# Patient Record
Sex: MALE | Race: WHITE | NOT HISPANIC OR LATINO | Employment: FULL TIME | URBAN - NONMETROPOLITAN AREA
[De-identification: names, ages, dates, MRNs, and addresses within clinical notes are randomized per-mention and may not be internally consistent; named-entity substitution may affect disease eponyms.]

---

## 2017-01-30 ENCOUNTER — OFFICE VISIT (OUTPATIENT)
Dept: INTERNAL MEDICINE | Facility: CLINIC | Age: 22
End: 2017-01-30

## 2017-01-30 VITALS
DIASTOLIC BLOOD PRESSURE: 80 MMHG | HEIGHT: 71 IN | SYSTOLIC BLOOD PRESSURE: 120 MMHG | WEIGHT: 223 LBS | HEART RATE: 79 BPM | BODY MASS INDEX: 31.22 KG/M2 | OXYGEN SATURATION: 98 %

## 2017-01-30 DIAGNOSIS — B37.2 CUTANEOUS CANDIDIASIS: Primary | ICD-10-CM

## 2017-01-30 PROCEDURE — 99213 OFFICE O/P EST LOW 20 MIN: CPT | Performed by: FAMILY MEDICINE

## 2017-01-30 RX ORDER — CLOTRIMAZOLE 1 %
CREAM (GRAM) TOPICAL 2 TIMES DAILY
Qty: 60 G | Refills: 2 | Status: SHIPPED | OUTPATIENT
Start: 2017-01-30 | End: 2017-09-25

## 2017-01-30 NOTE — MR AVS SNAPSHOT
Rickey Shaw   1/30/2017 11:45 AM   Office Visit    Provider:  Deric Pimentel DO   Department:  [facility] PRIMARY CARE   Dept Phone:  [phone]                Your Full Care Plan              Today's Medication Changes          These changes are accurate as of: 1/30/17 12:58 PM.  If you have any questions, ask your nurse or doctor.               New Medication(s)Ordered:     clotrimazole 1 % cream   Commonly known as:  LOTRIMIN   Apply  topically 2 (Two) Times a Day.   Started by:  Deric Pimentel DO         Stop taking medication(s)listed here:     albuterol 108 (90 BASE) MCG/ACT inhaler   Commonly known as:  PROVENTIL HFA;VENTOLIN HFA   Stopped by:  Deric Pimentel DO           azithromycin 250 MG tablet   Commonly known as:  ZITHROMAX Z-MCKAYLA   Stopped by:  Deric Pimentel DO           benzonatate 200 MG capsule   Commonly known as:  TESSALON   Stopped by:  Deric Pimentel DO           ibuprofen 200 MG tablet   Commonly known as:  ADVIL,MOTRIN   Stopped by:  Deric Pimentel DO                Where to Get Your Medications      These medications were sent to [facility] [address] [place] [address] - [address] [place] [address] - [phone] [place] [phone] [address] [address]     Phone:  [phone]     clotrimazole 1 % cream                  Your Updated Medication List          This list is accurate as of: 1/30/17 12:58 PM.  Always use your most recent med list.                clotrimazole 1 % cream   Commonly known as:  LOTRIMIN   Apply  topically 2 (Two) Times a Day.               You Were Diagnosed With        Codes Comments    Cutaneous candidiasis    -  Primary ICD-10-CM: B37.2  ICD-9-CM: 112.3       Instructions     None    Patient Instructions History      MyChart Signup     Our records indicate that your [facility] [employer] account has been deactivated. If you would like to reactivate your account, please email [email or web address] or  "call [phone] to talk to our MyChart staff.             Other Info from Your Visit           Allergies     Penicillins      Sulfa Antibiotics        Reason for Visit     Rash NOTICED SATURDAY, RIGHT ARM       Vital Signs     Blood Pressure Pulse Height Weight Oxygen Saturation Body Mass Index    120/80 79 71\" (180.3 cm) 223 lb (101 kg) 98% 31.1 kg/m2    Smoking Status                   Never Smoker           Problems and Diagnoses Noted     Yeast infection of the skin    -  Primary      "

## 2017-01-30 NOTE — PROGRESS NOTES
"Subjective   Rickey Shaw is a 21 y.o. male.     History of Present Illness   Rickey just noticed this rash on his right forearm, proximal to elbow, on Saturday.  He's been putting peroxide on it.  He states that it's foaming and he thinks that means it is getting infected.      The following portions of the patient's history were reviewed and updated as appropriate: allergies, current medications, past social history and problem list.    Review of Systems   Constitutional: Negative for appetite change, chills, fatigue, fever and unexpected weight change.   HENT: Negative for congestion, ear pain, hearing loss, nosebleeds, postnasal drip, rhinorrhea, sore throat, tinnitus and trouble swallowing.    Eyes: Negative for photophobia, discharge and visual disturbance.   Respiratory: Negative for cough, chest tightness, shortness of breath and wheezing.    Cardiovascular: Negative for chest pain, palpitations and leg swelling.   Gastrointestinal: Negative for abdominal distention, abdominal pain, blood in stool, constipation, diarrhea, nausea and vomiting.   Endocrine: Negative for cold intolerance, heat intolerance, polydipsia, polyphagia and polyuria.   Musculoskeletal: Negative for arthralgias, back pain, joint swelling, myalgias, neck pain and neck stiffness.   Skin: Negative for color change, pallor, rash and wound.        Rash, right forearm.   Allergic/Immunologic: Negative for environmental allergies, food allergies and immunocompromised state.   Neurological: Negative for dizziness, tremors, seizures, weakness, numbness and headaches.   Hematological: Negative for adenopathy. Does not bruise/bleed easily.   Psychiatric/Behavioral: Negative for agitation, behavioral problems, confusion, hallucinations, self-injury and suicidal ideas. The patient is not nervous/anxious.        Objective   Visit Vitals   • /80   • Pulse 79   • Ht 71\" (180.3 cm)   • Wt 223 lb (101 kg)   • SpO2 98%   • BMI 31.1 kg/m2 "     Physical Exam   Constitutional: He is oriented to person, place, and time. He appears well-developed and well-nourished. No distress.   HENT:   Head: Normocephalic and atraumatic.   Right Ear: External ear normal.   Left Ear: External ear normal.   Nose: Nose normal.   Mouth/Throat: Oropharynx is clear and moist.   Eyes: Conjunctivae and EOM are normal. Pupils are equal, round, and reactive to light. Right eye exhibits no discharge. Left eye exhibits no discharge. No scleral icterus.   Neck: Normal range of motion. Neck supple. No JVD present. No tracheal deviation present. No thyromegaly present.   Cardiovascular: Normal rate, regular rhythm, normal heart sounds and intact distal pulses.  Exam reveals no gallop and no friction rub.    No murmur heard.  Pulmonary/Chest: Effort normal and breath sounds normal. No stridor. No respiratory distress. He has no wheezes. He has no rales. He exhibits no tenderness.   Abdominal: Soft. Bowel sounds are normal. He exhibits no distension and no mass. There is no tenderness. There is no rebound and no guarding. No hernia.   Musculoskeletal: Normal range of motion. He exhibits no edema, tenderness or deformity.   Lymphadenopathy:     He has no cervical adenopathy.   Neurological: He is alert and oriented to person, place, and time. He has normal reflexes. He displays normal reflexes. No cranial nerve deficit. He exhibits normal muscle tone.   Skin: Skin is warm and dry. No rash noted. No erythema. No pallor.   Psychiatric: He has a normal mood and affect. His behavior is normal. Judgment normal.       Assessment/Plan   Problem List Items Addressed This Visit     None      Visit Diagnoses     Cutaneous candidiasis    -  Primary    Relevant Medications    clotrimazole (LOTRIMIN) 1 % cream        Fu prn.

## 2017-03-03 ENCOUNTER — OFFICE VISIT (OUTPATIENT)
Dept: INTERNAL MEDICINE | Facility: CLINIC | Age: 22
End: 2017-03-03

## 2017-03-03 VITALS
WEIGHT: 220 LBS | OXYGEN SATURATION: 98 % | DIASTOLIC BLOOD PRESSURE: 70 MMHG | BODY MASS INDEX: 30.8 KG/M2 | HEIGHT: 71 IN | HEART RATE: 83 BPM | SYSTOLIC BLOOD PRESSURE: 110 MMHG

## 2017-03-03 DIAGNOSIS — R21 RASH: Primary | ICD-10-CM

## 2017-03-03 PROCEDURE — 99213 OFFICE O/P EST LOW 20 MIN: CPT | Performed by: FAMILY MEDICINE

## 2017-03-03 RX ORDER — FLUCONAZOLE 150 MG/1
150 TABLET ORAL EVERY OTHER DAY
Qty: 5 TABLET | Refills: 0 | Status: SHIPPED | OUTPATIENT
Start: 2017-03-03 | End: 2017-09-25

## 2017-03-03 RX ORDER — CLOTRIMAZOLE AND BETAMETHASONE DIPROPIONATE 10; .64 MG/G; MG/G
CREAM TOPICAL 2 TIMES DAILY
Qty: 45 G | Refills: 2 | Status: SHIPPED | OUTPATIENT
Start: 2017-03-03 | End: 2017-09-25

## 2017-03-03 NOTE — PROGRESS NOTES
"Subjective   Rickey Shaw is a 21 y.o. male.     History of Present Illness   Rickey has been using the the clotrimazole and had no relief in his rash.  He used OTC ringworm and did not get any benefit.  He is fine with seeing Derm.  He is fine with trying lotrisone and diflucan until seeing EDerm.        The following portions of the patient's history were reviewed and updated as appropriate: allergies, current medications, past social history and problem list.    Review of Systems   Constitutional: Negative for appetite change, chills, fatigue, fever and unexpected weight change.   HENT: Negative for congestion, ear pain, hearing loss, nosebleeds, postnasal drip, rhinorrhea, sore throat, tinnitus and trouble swallowing.    Eyes: Negative for photophobia, discharge and visual disturbance.   Respiratory: Negative for cough, chest tightness, shortness of breath and wheezing.    Cardiovascular: Negative for chest pain, palpitations and leg swelling.   Gastrointestinal: Negative for abdominal distention, abdominal pain, blood in stool, constipation, diarrhea, nausea and vomiting.   Endocrine: Negative for cold intolerance, heat intolerance, polydipsia, polyphagia and polyuria.   Musculoskeletal: Negative for arthralgias, back pain, joint swelling, myalgias, neck pain and neck stiffness.   Skin: Negative for color change, pallor, rash and wound.   Allergic/Immunologic: Negative for environmental allergies, food allergies and immunocompromised state.   Neurological: Negative for dizziness, tremors, seizures, weakness, numbness and headaches.   Hematological: Negative for adenopathy. Does not bruise/bleed easily.   Psychiatric/Behavioral: Negative for agitation, behavioral problems, confusion, hallucinations, self-injury and suicidal ideas. The patient is not nervous/anxious.        Objective   Visit Vitals   • /70   • Pulse 83   • Ht 71\" (180.3 cm)   • Wt 220 lb (99.8 kg)   • SpO2 98%   • BMI 30.68 kg/m2 "     Physical Exam   Constitutional: He is oriented to person, place, and time. He appears well-developed and well-nourished. No distress.   HENT:   Head: Normocephalic and atraumatic.   Right Ear: External ear normal.   Left Ear: External ear normal.   Nose: Nose normal.   Mouth/Throat: Oropharynx is clear and moist.   Eyes: Conjunctivae and EOM are normal. Pupils are equal, round, and reactive to light. Right eye exhibits no discharge. Left eye exhibits no discharge. No scleral icterus.   Neck: Normal range of motion. Neck supple. No JVD present. No tracheal deviation present. No thyromegaly present.   Cardiovascular: Normal rate, regular rhythm, normal heart sounds and intact distal pulses.  Exam reveals no gallop and no friction rub.    No murmur heard.  Pulmonary/Chest: Effort normal and breath sounds normal. No stridor. No respiratory distress. He has no wheezes. He has no rales. He exhibits no tenderness.   Abdominal: Soft. Bowel sounds are normal. He exhibits no distension and no mass. There is no tenderness. There is no rebound and no guarding. No hernia.   Musculoskeletal: Normal range of motion. He exhibits no edema, tenderness or deformity.   Lymphadenopathy:     He has no cervical adenopathy.   Neurological: He is alert and oriented to person, place, and time. He has normal reflexes. He displays normal reflexes. No cranial nerve deficit. He exhibits normal muscle tone.   Skin: Skin is warm and dry. No rash noted. No erythema. No pallor.   Red, raised, dry scaly skin in patches on both elbows.  No target lesions.   Psychiatric: He has a normal mood and affect. His behavior is normal. Judgment normal.       Assessment/Plan   Problem List Items Addressed This Visit        Musculoskeletal and Integument    Rash - Primary    Relevant Medications    fluconazole (DIFLUCAN) 150 MG tablet    clotrimazole-betamethasone (LOTRISONE) 1-0.05 % cream    Other Relevant Orders    Ambulatory Referral to Dermatology

## 2017-09-25 ENCOUNTER — OFFICE VISIT (OUTPATIENT)
Dept: INTERNAL MEDICINE | Facility: CLINIC | Age: 22
End: 2017-09-25

## 2017-09-25 VITALS
HEIGHT: 71 IN | DIASTOLIC BLOOD PRESSURE: 68 MMHG | SYSTOLIC BLOOD PRESSURE: 122 MMHG | TEMPERATURE: 98.7 F | HEART RATE: 83 BPM | WEIGHT: 230 LBS | BODY MASS INDEX: 32.2 KG/M2 | OXYGEN SATURATION: 98 %

## 2017-09-25 DIAGNOSIS — M25.512 LEFT ANTERIOR SHOULDER PAIN: Primary | ICD-10-CM

## 2017-09-25 PROCEDURE — 99213 OFFICE O/P EST LOW 20 MIN: CPT | Performed by: PHYSICIAN ASSISTANT

## 2017-09-25 RX ORDER — IBUPROFEN 800 MG/1
800 TABLET ORAL EVERY 8 HOURS PRN
Qty: 60 TABLET | Refills: 0 | Status: SHIPPED | OUTPATIENT
Start: 2017-09-25 | End: 2017-12-26

## 2017-09-25 NOTE — PROGRESS NOTES
Rickey Shaw is a 21 y.o. male.     Subjective   History of Present Illness   Here today with concern of left shoulder pain x 2 weeks since playing flag football.  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  Also has increased pain when he lets the arm dangle by his side.  Unsure of how or when he injured the shoulder during the game.         The following portions of the patient's history were reviewed and updated as appropriate: allergies, current medications, past family history, past medical history, past social history, past surgical history and problem list.    Review of Systems    Constitutional: Negative for appetite change, chills, fatigue, fever and unexpected weight change.   HENT: Negative for congestion, ear pain, hearing loss, nosebleeds, postnasal drip, rhinorrhea, sore throat, tinnitus and trouble swallowing.    Eyes: Negative for photophobia, discharge and visual disturbance.   Respiratory: Negative for cough, chest tightness, shortness of breath and wheezing.    Cardiovascular: Negative for chest pain, palpitations and leg swelling.   Gastrointestinal: Negative for abdominal distention, abdominal pain, blood in stool, constipation, diarrhea, nausea and vomiting.   Endocrine: Negative for cold intolerance, heat intolerance, polydipsia, polyphagia and polyuria.   Musculoskeletal: left shoulder pain. Negative for back pain, joint swelling, myalgias, neck pain and neck stiffness.   Skin: Negative for color change, pallor, rash and wound.   Allergic/Immunologic: Negative for environmental allergies, food allergies and immunocompromised state.   Neurological: Negative for dizziness, tremors, seizures, weakness, numbness and headaches.   Hematological: Negative for adenopathy. Does not bruise/bleed easily.   Psychiatric/Behavioral: Negative for sleep disturbances, agitation, behavioral problems, confusion, hallucinations, self-injury  "and suicidal ideas. The patient is not nervous/anxious.      Objective    Physical Exam  Constitutional: Oriented to person, place, and time. Appears well-developed and well-nourished.   HENT:   Head: Normocephalic and atraumatic.   Eyes: EOM are normal. Pupils are equal, round, and reactive to light.   Neck: Normal range of motion. Neck supple.   Cardiovascular: Normal rate, regular rhythm and normal heart sounds.    Pulmonary/Chest: Effort normal and breath sounds normal. No respiratory distress.  Has no wheezes or rales. Exhibits no chest wall tenderness.   Abdominal: Soft. Bowel sounds are normal. Exhibits no distension and no mass. There is no tenderness.   Neurological: Alert and oriented to person, place, and time.   Skin: Skin is warm and dry.   Psychiatric: Has a normal mood and affect. Behavior is normal. Judgment and thought content normal.   Left Shoulder Exam     Tenderness   The patient is experiencing tenderness in the clavicle, AC joint.    Range of Motion   Normal left shoulder ROM  Active Abduction:                       Normal  Passive Abduction:                    Normal  Extension:                                  Normal  Forward Flexion:                        180+    Tests   Cross Arm:      Negative  Drop Arm:        Negative  Sulcus:            Negative          /68  Pulse 83  Temp 98.7 °F (37.1 °C)  Ht 71\" (180.3 cm)  Wt 230 lb (104 kg)  SpO2 98%  BMI 32.08 kg/m2    Nursing note and vitals reviewed.          Assessment/Plan   Rickey was seen today for shoulder pain.    Diagnoses and all orders for this visit:    Left anterior shoulder pain  -     Ambulatory Referral to Physical Therapy Evaluate and treat  -     ibuprofen (ADVIL,MOTRIN) 800 MG tablet; Take 1 tablet by mouth Every 8 (Eight) Hours As Needed for Mild Pain .    F/u in 6 weeks if pain has not improved. MRI will be considered at that time.            "

## 2017-10-04 ENCOUNTER — TREATMENT (OUTPATIENT)
Dept: PHYSICAL THERAPY | Facility: CLINIC | Age: 22
End: 2017-10-04

## 2017-10-04 DIAGNOSIS — M25.512 LEFT SHOULDER PAIN, UNSPECIFIED CHRONICITY: Primary | ICD-10-CM

## 2017-10-04 PROCEDURE — 97161 PT EVAL LOW COMPLEX 20 MIN: CPT | Performed by: PHYSICAL THERAPIST

## 2017-10-04 NOTE — PROGRESS NOTES
Physical Therapy Initial Evaluation and Plan of Care    Patient: Rickey Shaw   : 1995  Diagnosis/ICD-10 Code:  Left shoulder pain, unspecified chronicity [M25.512]  Referring practitioner: DEEP Kwon  Date of Initial Visit: 10/4/2017  Today's Date: 10/4/2017  Patient seen for 1 sessions             Subjective Evaluation    History of Present Illness  Mechanism of injury: Pt has pain in the front of the left shoulder, he states that he can raise his arm to the side without much pain but raising to the front is more painful. Pain started about 2 weeks ago when he was playing flag football, he fell and landed on the left shoulder and that is when his pain started.       Patient Occupation:  - pain with any lifting with the left arm Quality of life: good    Pain  Current pain ratin  At best pain ratin  At worst pain ratin  Location: Anterior left shoulder   Quality: dull ache and sharp  Relieving factors: rest (stretching)  Aggravating factors: outstretched reach and lifting (carrying boxes)  Progression: worsening    Hand dominance: right    Diagnostic Tests  No diagnostic tests performed    Treatments  No previous or current treatments  Patient Goals  Patient goals for therapy: decreased pain, increased motion, increased strength, independence with ADLs/IADLs and return to sport/leisure activities             Objective     Palpation     Additional Palpation Details  TTP along the left AC joint and left anterior GH joint     Active Range of Motion   Left Shoulder   Flexion: 85 degrees   Extension: 45 degrees   Abduction: 116 degrees   External rotation 0°: 49 degrees   Internal rotation BTB: T9     Strength/Myotome Testing     Left Shoulder     Planes of Motion   Flexion: 4   Abduction: 4-   External rotation at 0°: 4+   Internal rotation at 0°: 4+     Tests     Left Shoulder   Positive AC shear and Hawkin's.   Negative drop arm, empty can and full can.          Assessment  & Plan     Assessment  Impairments: abnormal muscle tone, abnormal or restricted ROM, activity intolerance, impaired physical strength, lacks appropriate home exercise program and pain with function  Assessment details: Patient is a 21 year old male who comes to physical therapy with c/o pain in the left shoulder after falling onto the left arm while playing flag football. Signs and symptoms are consistent with left AC joint sprain resulting in pain, decreased ROM, decreased strength, and inability to perform all essential functional activities. Pt will benefit from skilled PT services to address the above issues.     Prognosis details: SHORT TERM GOALS:     2 weeks  1. Pt I w/ HEP  2. Pt to demonstrate PROM of the left shoulder to WFL to improve ability to perform ADL's  3. Pt to demonstrate ability to perform 30 minutes continuous activity in the clinic without increase in pain     LONG TERM GOALS:   6 weeks  1. Pt to demonstrate AROM of the left shoulder to WNL to allow ability to perform all necessary functional activities  2. Pt to demonstrate ability to lift 5# OH with the left arm without increase in pain  3. Pt to report being able to work full duty without increase in pain in the shoulder  4. Pt to tolerate 60 minutes continuous activity in the clinic without increase in pain     Functional Limitations: carrying objects, lifting, pulling, pushing, uncomfortable because of pain, reaching behind back, reaching overhead and unable to perform repetitive tasks  Plan  Therapy options: will be seen for skilled physical therapy services  Planned modality interventions: cryotherapy, electrical stimulation/Russian stimulation, high voltage pulsed current (pain management), iontophoresis, microcurrent electrical stimulation, TENS, thermotherapy (hydrocollator packs) and ultrasound  Planned therapy interventions: abdominal trunk stabilization, ADL retraining, body mechanics training, flexibility, functional ROM  exercises, home exercise program, IADL retraining, joint mobilization, manual therapy, neuromuscular re-education, postural training, soft tissue mobilization, spinal/joint mobilization, strengthening, stretching and therapeutic activities  Frequency: 2x week  Duration in weeks: 6  Treatment plan discussed with: patient      Evaluation Only    PT SIGNATURE: Alejandro Woodward PT   DATE TREATMENT INITIATED: 10/4/2017    Initial Certification  Certification Period: 1/2/2018  I certify that the therapy services are furnished while this patient is under my care.  The services outlined above are required by this patient, and will be reviewed every 90 days.     PHYSICIAN: DEEP Kwon      DATE:     Please sign and return via fax to [phone].. Thank you, [facility].

## 2017-10-18 ENCOUNTER — TREATMENT (OUTPATIENT)
Dept: PHYSICAL THERAPY | Facility: CLINIC | Age: 22
End: 2017-10-18

## 2017-10-18 DIAGNOSIS — M25.512 LEFT SHOULDER PAIN, UNSPECIFIED CHRONICITY: Primary | ICD-10-CM

## 2017-10-18 PROCEDURE — 97140 MANUAL THERAPY 1/> REGIONS: CPT | Performed by: PHYSICAL THERAPIST

## 2017-10-18 PROCEDURE — 97110 THERAPEUTIC EXERCISES: CPT | Performed by: PHYSICAL THERAPIST

## 2017-10-19 NOTE — PROGRESS NOTES
Physical Therapy Daily Progress Note    Subjective   Rickey Shaw reports that he is feeling a little better today, he has less pain in the shoulder overall but he continues to have pain when he uses his shoulder a lot while working.     Objective   See Exercise, Manual, and Modality Logs for complete treatment.       Assessment/Plan     Pt had some fatigue in the left arm today with therapy but he was able to perform all exercises without exacerbation of symptoms.     Progress per Plan of Care           Manual Therapy:    13     mins  78339;  Therapeutic Exercise:    48     mins  70752;     Neuromuscular Minal:        mins  83434;    Therapeutic Activity:          mins  96977;     Gait Training:           mins  37039;     Ultrasound:          mins  22714;    Electrical Stimulation:         mins  62045 ( );  Dry Needling          mins self-pay    Timed Treatment:   61   mins   Total Treatment:     65   mins    Alejandro Woodward PT  Physical Therapist

## 2017-10-20 ENCOUNTER — TREATMENT (OUTPATIENT)
Dept: PHYSICAL THERAPY | Facility: CLINIC | Age: 22
End: 2017-10-20

## 2017-10-20 DIAGNOSIS — M25.512 LEFT SHOULDER PAIN, UNSPECIFIED CHRONICITY: Primary | ICD-10-CM

## 2017-10-20 PROCEDURE — 97110 THERAPEUTIC EXERCISES: CPT | Performed by: PHYSICAL THERAPIST

## 2017-10-20 PROCEDURE — 97140 MANUAL THERAPY 1/> REGIONS: CPT | Performed by: PHYSICAL THERAPIST

## 2017-10-20 PROCEDURE — 97014 ELECTRIC STIMULATION THERAPY: CPT | Performed by: PHYSICAL THERAPIST

## 2017-10-23 NOTE — PROGRESS NOTES
Physical Therapy Daily Progress Note    Subjective   Rickey Shaw reports that his shoulder is feeling a little better, he has been able to go to the gym a little this week.    Objective   See Exercise, Manual, and Modality Logs for complete treatment.       Assessment/Plan     Pt responded well to treatment today, he demonstrated improved shoulder AROM and strength with resisted exercise in the clinic.     Progress per Plan of Care and Progress strengthening /stabilization /functional activity           Manual Therapy:    16     mins  82285;  Therapeutic Exercise:    30     mins  10266;     Neuromuscular Minal:        mins  25801;    Therapeutic Activity:          mins  18177;     Gait Training:           mins  07805;     Ultrasound:          mins  77619;    Electrical Stimulation:    20     mins  53830 ( );  Dry Needling          mins self-pay    Timed Treatment:   46   mins   Total Treatment:     75   mins    Alejandro Woodward, PT  Physical Therapist

## 2017-10-25 ENCOUNTER — TREATMENT (OUTPATIENT)
Dept: PHYSICAL THERAPY | Facility: CLINIC | Age: 22
End: 2017-10-25

## 2017-10-25 DIAGNOSIS — M25.512 LEFT SHOULDER PAIN, UNSPECIFIED CHRONICITY: Primary | ICD-10-CM

## 2017-10-25 PROCEDURE — 97110 THERAPEUTIC EXERCISES: CPT | Performed by: PHYSICAL THERAPIST

## 2017-10-25 PROCEDURE — 97014 ELECTRIC STIMULATION THERAPY: CPT | Performed by: PHYSICAL THERAPIST

## 2017-11-01 ENCOUNTER — TREATMENT (OUTPATIENT)
Dept: PHYSICAL THERAPY | Facility: CLINIC | Age: 22
End: 2017-11-01

## 2017-11-01 DIAGNOSIS — M25.512 LEFT SHOULDER PAIN, UNSPECIFIED CHRONICITY: Primary | ICD-10-CM

## 2017-11-01 PROCEDURE — 97014 ELECTRIC STIMULATION THERAPY: CPT | Performed by: PHYSICAL THERAPIST

## 2017-11-01 PROCEDURE — 97110 THERAPEUTIC EXERCISES: CPT | Performed by: PHYSICAL THERAPIST

## 2017-11-02 NOTE — PROGRESS NOTES
Physical Therapy Daily Progress Note    Subjective   Rickey Shaw reports that his shoulder is feeling a lot better, he was able to work this weekend without having any increase in discomfort in the shoulder     Objective   See Exercise, Manual, and Modality Logs for complete treatment.       Assessment/Plan     Pt is making steady progress, he shows improvement in his tolerance to exercise and is able to complete full ROM exercises without having an exacerbation of his symptoms.     Progress per Plan of Care and Progress strengthening /stabilization /functional activity           Manual Therapy:         mins  44760;  Therapeutic Exercise:    46     mins  41422;     Neuromuscular Minal:        mins  44587;    Therapeutic Activity:          mins  51310;     Gait Training:           mins  50355;     Ultrasound:          mins  73803;    Electrical Stimulation:    20     mins  89175 ( );  Dry Needling          mins self-pay    Timed Treatment:   46   mins   Total Treatment:     70   mins    Alejandro Woodward PT  Physical Therapist

## 2017-11-14 ENCOUNTER — OFFICE VISIT (OUTPATIENT)
Dept: INTERNAL MEDICINE | Facility: CLINIC | Age: 22
End: 2017-11-14

## 2017-11-14 VITALS
TEMPERATURE: 98.3 F | SYSTOLIC BLOOD PRESSURE: 120 MMHG | BODY MASS INDEX: 32.2 KG/M2 | WEIGHT: 230 LBS | HEART RATE: 91 BPM | DIASTOLIC BLOOD PRESSURE: 70 MMHG | HEIGHT: 71 IN | OXYGEN SATURATION: 93 %

## 2017-11-14 DIAGNOSIS — J01.10 ACUTE NON-RECURRENT FRONTAL SINUSITIS: Primary | ICD-10-CM

## 2017-11-14 PROCEDURE — 99213 OFFICE O/P EST LOW 20 MIN: CPT | Performed by: FAMILY MEDICINE

## 2017-11-14 RX ORDER — AZITHROMYCIN 250 MG/1
TABLET, FILM COATED ORAL
Qty: 6 TABLET | Refills: 0 | Status: SHIPPED | OUTPATIENT
Start: 2017-11-14 | End: 2017-12-26

## 2017-11-14 NOTE — PROGRESS NOTES
Subjective    Rickey Shaw is a 21 y.o. male here for:  Chief Complaint   Patient presents with   • Headache     STARTED THURSDAY WITH HEADACHE, BODY ACHES      History of Present Illness     He thinks he may have a cold or maybe headache from stress. Latin is very hard. Headache with light sensitivity. Has been achy. Congestion, sore throat. No fevers but has had chills. Sick for over five days. Not shortness of breath but cough with a lot of chest congestion. Sinuses don't feel bad.     The following portions of the patient's history were reviewed and updated as appropriate: allergies, current medications, past family history, past medical history, past social history, past surgical history and problem list.    Review of Systems   Constitutional: Positive for activity change and fatigue. Negative for chills and fever.   HENT: Positive for congestion.    Respiratory: Positive for cough.        Vitals:    11/14/17 1157   BP: 120/70   Pulse: 91   Temp: 98.3 °F (36.8 °C)   SpO2: 93%       Objective   Physical Exam   Constitutional: He is oriented to person, place, and time. Vital signs are normal. He appears well-developed and well-nourished. He is active. He appears ill.   HENT:   Head: Normocephalic and atraumatic.   Right Ear: Hearing, tympanic membrane, external ear and ear canal normal. Tympanic membrane is not erythematous.   Left Ear: Hearing, tympanic membrane, external ear and ear canal normal. Tympanic membrane is not erythematous.   Nose: Right sinus exhibits maxillary sinus tenderness. Left sinus exhibits maxillary sinus tenderness.   Mouth/Throat: Uvula is midline, oropharynx is clear and moist and mucous membranes are normal.   Eyes: EOM and lids are normal.   Neck: Phonation normal. Neck supple.   Cardiovascular: Normal rate, regular rhythm and normal heart sounds.    Pulmonary/Chest: Effort normal and breath sounds normal. No stridor.   Lymphadenopathy:     He has no cervical adenopathy.    Neurological: He is alert and oriented to person, place, and time. No cranial nerve deficit.   Skin: He is not diaphoretic.   Psychiatric: He has a normal mood and affect. His behavior is normal.   Nursing note and vitals reviewed.      Assessment/Plan   Rickey was seen today for headache.    Diagnoses and all orders for this visit:    Acute non-recurrent frontal sinusitis  -     azithromycin (ZITHROMAX) 250 MG tablet; Take 2 tablets the first day, then 1 tablet daily for 4 days.               Brooke Gerber MD

## 2017-11-15 ENCOUNTER — TREATMENT (OUTPATIENT)
Dept: PHYSICAL THERAPY | Facility: CLINIC | Age: 22
End: 2017-11-15

## 2017-11-15 DIAGNOSIS — M25.512 LEFT SHOULDER PAIN, UNSPECIFIED CHRONICITY: Primary | ICD-10-CM

## 2017-11-15 PROCEDURE — 97014 ELECTRIC STIMULATION THERAPY: CPT | Performed by: PHYSICAL THERAPIST

## 2017-11-15 PROCEDURE — 97110 THERAPEUTIC EXERCISES: CPT | Performed by: PHYSICAL THERAPIST

## 2017-11-17 NOTE — PROGRESS NOTES
Physical Therapy Daily Progress Note    Subjective   Rickey Shaw reports that he is a little more sore today, he thinks he may have slept on the shoulder for the past couple of days     Objective   See Exercise, Manual, and Modality Logs for complete treatment.       Assessment/Plan     Pt fatigued with exercise today but he did not report an increase in his pain level. Will continue to progress stabilization activities of the shoulder.     Progress per Plan of Care and Progress strengthening /stabilization /functional activity           Manual Therapy:         mins  46794;  Therapeutic Exercise:    46     mins  93585;     Neuromuscular Minal:        mins  65236;    Therapeutic Activity:          mins  04517;     Gait Training:           mins  03853;     Ultrasound:          mins  87942;    Electrical Stimulation:    20     mins  50992 ( );  Dry Needling          mins self-pay    Timed Treatment:   46   mins   Total Treatment:     70   mins    Alejandro Woodward, PT  Physical Therapist

## 2017-12-26 ENCOUNTER — OFFICE VISIT (OUTPATIENT)
Dept: INTERNAL MEDICINE | Facility: CLINIC | Age: 22
End: 2017-12-26

## 2017-12-26 VITALS
BODY MASS INDEX: 32.76 KG/M2 | HEIGHT: 71 IN | TEMPERATURE: 98.5 F | DIASTOLIC BLOOD PRESSURE: 82 MMHG | SYSTOLIC BLOOD PRESSURE: 118 MMHG | OXYGEN SATURATION: 99 % | WEIGHT: 234 LBS | HEART RATE: 78 BPM

## 2017-12-26 DIAGNOSIS — L20.82 FLEXURAL ECZEMA: Primary | ICD-10-CM

## 2017-12-26 PROCEDURE — 99213 OFFICE O/P EST LOW 20 MIN: CPT | Performed by: NURSE PRACTITIONER

## 2017-12-26 NOTE — PROGRESS NOTES
Chief Complaint / Reason:      Chief Complaint   Patient presents with   • Rash     hands, wants derm referral       Subjective     HPI   Patient presents today with rash on his hands and states he would like dermatology referral.  He states that he has had eczema in the past and feels like he is getting flareups and he has tried multiple medications he has tried over-the-counter medications along with prescription steroids and nothing seems to have worked.  He denies numbness or tingling or any changes in dietary or household items.  Vital signs are stable.  History taken from: patient    PMH/FH/Social History were reviewed and updated appropriately in the electronic medical record.      Review of Systems:   Review of Systems   Constitutional: Negative.    Respiratory: Negative.    Cardiovascular: Negative.    Gastrointestinal: Negative.    Musculoskeletal: Negative.    Skin: Positive for rash.     All other systems were reviewed and are negative.  Exceptions are noted in the subjective or above.      Objective     Vital Signs  Vitals:    12/26/17 1347   BP: 118/82   Pulse: 78   Temp: 98.5 °F (36.9 °C)   SpO2: 99%       Body mass index is 32.65 kg/(m^2).    Physical Exam   Constitutional: He is oriented to person, place, and time. He appears well-developed and well-nourished.   Cardiovascular: Normal rate, regular rhythm, normal heart sounds and intact distal pulses.    Pulmonary/Chest: Effort normal and breath sounds normal. He exhibits no tenderness.   Abdominal: Soft. Bowel sounds are normal.   Neurological: He is alert and oriented to person, place, and time.   Skin: Skin is warm and dry. Rash noted. There is erythema. There is pallor.   Psychiatric: He has a normal mood and affect. His behavior is normal. Judgment and thought content normal.   Nursing note and vitals reviewed.        Medication Review:     Current Outpatient Prescriptions:   •  Crisaborole 2 % ointment, Apply 1 application topically 2 (Two)  Times a Day., Disp: 60 g, Rfl: 1    Assessment/Plan   Rickey was seen today for rash.    Diagnoses and all orders for this visit:    Flexural eczema  -     Crisaborole 2 % ointment; Apply 1 application topically 2 (Two) Times a Day.    Recommend patient increase water intake and maintain hydration and avoid extreme temperatures by wearing protective clothing.  Will give patient a dermatology referral if medication does not improve symptoms.  Patient states he would like to wait at this time.  Return if symptoms worsen or fail to improve.    Kia Clayton, APRN  12/26/2017

## 2017-12-28 ENCOUNTER — TREATMENT (OUTPATIENT)
Dept: PHYSICAL THERAPY | Facility: CLINIC | Age: 22
End: 2017-12-28

## 2017-12-28 DIAGNOSIS — M25.512 LEFT SHOULDER PAIN, UNSPECIFIED CHRONICITY: Primary | ICD-10-CM

## 2017-12-28 PROCEDURE — 97161 PT EVAL LOW COMPLEX 20 MIN: CPT | Performed by: PHYSICAL THERAPIST

## 2017-12-28 NOTE — PROGRESS NOTES
Physical Therapy Initial Evaluation and Plan of Care      Patient: Rickey Shaw   : 1995  Diagnosis/ICD-10 Code:  No primary diagnosis found.  Referring practitioner: DEEP Kwon    Subjective Evaluation    History of Present Illness  Mechanism of injury: Pt had initial injury inSeptember when he was playing football and landed on his shoulder.      He was doing well and improving but ran out of PT approval.  Since the PT has stopped he is reporting having more pain and more weakness.        Patient Occupation: ,  Pain  Current pain ratin  At worst pain ratin  Location: L shoulder anterior shoulder.   Quality: burning and sharp (sharp pain has been absent for several weeks)  Aggravating factors: lifting, movement, sleeping, repetitive movement and outstretched reach  Progression: worsening (since last PT episode )    Hand dominance: right    Treatments  Previous treatment: physical therapy (Last PT in november)  Patient Goals  Patient goals for therapy: increased strength, return to sport/leisure activities, independence with ADLs/IADLs, decreased pain and increased motion             Objective       Palpation   Left   Hypertonic in the biceps.   Tenderness of the biceps.     Additional Palpation Details  Bicep tendon SH and LH are the most tender areas noted on the L Shoulder today.     Tenderness     Left Shoulder   Tenderness in the AC joint, biceps tendon (proximal) and coracoid process.     Active Range of Motion   Left Shoulder   Flexion: 155 (supine) degrees with pain  External rotation 90°: 82 degrees   Internal rotation 90°: 68 degrees     Right Shoulder   Flexion: 174 degrees   External rotation 90°: 95 degrees  Internal rotation 90°: 71 degrees     Joint Play     Comments  Left AC joint comments: pain.     Additional Joint Play Details  L shoulder joint mobility testing was limited by MM guarding due to pain in the L shoulder.     Strength/Myotome Testing     Left  Shoulder     Planes of Motion   Flexion: 4-   Extension: 3+   Abduction: 4-   Adduction: 4-   External rotation at 0°: 4-   Internal rotation at 0°: 4     Isolated Muscles   Serratus anterior: 4-     Right Shoulder     Planes of Motion   Flexion: 5   Extension: 5   Abduction: 5   Adduction: 5   External rotation at 0°: 5     Tests     Left Shoulder   Positive AC shear, Speed's and Yergason's.     Additional Tests Details  Bicep and AC joint           Assessment & Plan     Assessment  Impairments: abnormal muscle firing, abnormal or restricted ROM, activity intolerance, impaired physical strength, lacks appropriate home exercise program and pain with function  Assessment details: Patient is a 22 year old male who comes to physical therapy with L shoulder pain and weakness. Signs and symptoms are consistent with A/C joint sprain and bicep tendonitis.  The patient currently has pain, decreased ROM, decreased strength, and inability to perform all essential functional activities. Pt will benefit from skilled PT services to address the above issues.     Prognosis: good  Prognosis details:   SHORT TERM GOALS:     2 weeks  1. Pt I w/ HEP and strengthening exercises.  2. Pt to demonstrate PROM of the left shoulder to WFL to improve ability to perform ADL's  3. Pt to demonstrate ability to perform 30 minutes continuous activity in the clinic without increase in pain   4. Pt will have 4/5 strength in the L UE.     LONG TERM GOALS:   6 weeks  1. Pt to demonstrate AROM of the left shoulder to WNL to allow ability to perform all necessary functional activities  2. Pt to demonstrate ability to lift 10# OH with the left arm without increase in pain  3. Pt to report being able to work full duty without increase in pain in the shoulder  4. Pt to tolerate 60 minutes continuous activity in the clinic without increase in pain     Functional Limitations: carrying objects, lifting, sleeping, pulling, pushing, reaching behind back,  reaching overhead and unable to perform repetitive tasks  Plan  Therapy options: will be seen for skilled physical therapy services  Planned modality interventions: cryotherapy, electrical stimulation/Russian stimulation, thermotherapy (hydrocollator packs) and ultrasound  Planned therapy interventions: manual therapy, flexibility, functional ROM exercises, home exercise program, joint mobilization, body mechanics training, postural training, soft tissue mobilization, strengthening, stretching and therapeutic activities  Treatment plan discussed with: patient  Plan details: Pt to be seen 2 days per week for 4-6 weeks.         Manual Therapy:         mins  71437;  Therapeutic Exercise:         mins  29477;     Neuromuscular Minal:        mins  09891;    Therapeutic Activity:          mins  69516;     Gait Training:           mins  77969;     Ultrasound:          mins  43101;    Electrical Stimulation:         mins  63137 ( );  Dry Needling          mins self-pay    Timed Treatment:      mins   Total Treatment:     46   mins    PT SIGNATURE: Leonides Larry, PT   DATE TREATMENT INITIATED: 12/28/2017    Initial Certification  Certification Period: 3/28/2018  I certify that the therapy services are furnished while this patient is under my care.  The services outlined above are required by this patient, and will be reviewed every 90 days.     PHYSICIAN: DEEP Kwon      DATE:     Please sign and return via fax to  .. Thank you, [facility].

## 2018-01-09 ENCOUNTER — TREATMENT (OUTPATIENT)
Dept: PHYSICAL THERAPY | Facility: CLINIC | Age: 23
End: 2018-01-09

## 2018-01-09 DIAGNOSIS — M25.512 LEFT SHOULDER PAIN, UNSPECIFIED CHRONICITY: Primary | ICD-10-CM

## 2018-01-09 PROCEDURE — 97110 THERAPEUTIC EXERCISES: CPT | Performed by: PHYSICAL THERAPIST

## 2018-01-09 NOTE — PROGRESS NOTES
Physical Therapy Daily Progress Note      Visit # : 2    Rickey Shaw reports 2/10 pain today at rest.  Pt returned to the gym this past week.     Gym exercises he has been doing include:  OH press at gym.  Lateral raises, shoulder flexion with DB. Upright row         Objective Pt present to PT today with no distress noted at rest.     Pt able to perform all exercises today without elevated pain.     Bicep tendon slightly irritated upon palpation.     L shoulder PROM ER 95 degrees, Flexion 175 degrees.       See Exercise, Manual, and Modality Logs for complete treatment.     Assessment/Plan  Pt with less distress and less pain noted in the L shoulder.       Progress per Plan of Care             Manual Therapy:    4     mins  82540;  Therapeutic Exercise:    53     mins  51791;     Neuromuscular Minal:        mins  50749;    Therapeutic Activity:          mins  08609;     Gait Training:        ___  mins  74490;     Ultrasound:          mins  72546;    Electrical Stimulation:         mins  86328 ( );  Dry Needling          mins self-pay    Timed Treatment:   57   mins   Total Treatment:     58   mins    Leonides Larry, PT  Physical Therapist

## 2018-01-12 ENCOUNTER — TREATMENT (OUTPATIENT)
Dept: PHYSICAL THERAPY | Facility: CLINIC | Age: 23
End: 2018-01-12

## 2018-01-12 DIAGNOSIS — M25.512 LEFT SHOULDER PAIN, UNSPECIFIED CHRONICITY: Primary | ICD-10-CM

## 2018-01-12 PROCEDURE — 97014 ELECTRIC STIMULATION THERAPY: CPT | Performed by: PHYSICAL THERAPIST

## 2018-01-12 PROCEDURE — 97110 THERAPEUTIC EXERCISES: CPT | Performed by: PHYSICAL THERAPIST

## 2018-01-15 NOTE — PROGRESS NOTES
Physical Therapy Daily Progress Note    Subjective   Rickey Shaw reports that he has been sore with working out but his pain isn't too bad in the shoulder     Objective   See Exercise, Manual, and Modality Logs for complete treatment.       Assessment/Plan     Pt is making good progress with therapy, he was able to complete exercise in the clinic today without exacerbation but he did have some fatigue.     Progress per Plan of Care and Progress strengthening /stabilization /functional activity           Manual Therapy:         mins  95531;  Therapeutic Exercise:    46     mins  73371;     Neuromuscular Mianl:        mins  51381;    Therapeutic Activity:          mins  00479;     Gait Training:           mins  22759;     Ultrasound:          mins  41061;    Electrical Stimulation:    20     mins  43376 ( );  Dry Needling          mins self-pay    Timed Treatment:   46   mins   Total Treatment:     70   mins    Alejandro Woodward, PT  Physical Therapist

## 2018-01-31 ENCOUNTER — TREATMENT (OUTPATIENT)
Dept: PHYSICAL THERAPY | Facility: CLINIC | Age: 23
End: 2018-01-31

## 2018-01-31 DIAGNOSIS — M25.512 LEFT SHOULDER PAIN, UNSPECIFIED CHRONICITY: Primary | ICD-10-CM

## 2018-01-31 PROCEDURE — 97110 THERAPEUTIC EXERCISES: CPT | Performed by: PHYSICAL THERAPIST

## 2018-01-31 PROCEDURE — 97140 MANUAL THERAPY 1/> REGIONS: CPT | Performed by: PHYSICAL THERAPIST

## 2018-01-31 PROCEDURE — 97014 ELECTRIC STIMULATION THERAPY: CPT | Performed by: PHYSICAL THERAPIST

## 2018-02-02 ENCOUNTER — TREATMENT (OUTPATIENT)
Dept: PHYSICAL THERAPY | Facility: CLINIC | Age: 23
End: 2018-02-02

## 2018-02-02 DIAGNOSIS — M25.512 LEFT SHOULDER PAIN, UNSPECIFIED CHRONICITY: Primary | ICD-10-CM

## 2018-02-02 PROCEDURE — 97014 ELECTRIC STIMULATION THERAPY: CPT | Performed by: PHYSICAL THERAPIST

## 2018-02-02 PROCEDURE — 97110 THERAPEUTIC EXERCISES: CPT | Performed by: PHYSICAL THERAPIST

## 2018-02-02 PROCEDURE — 97140 MANUAL THERAPY 1/> REGIONS: CPT | Performed by: PHYSICAL THERAPIST

## 2018-02-05 NOTE — PROGRESS NOTES
Physical Therapy Daily Progress Note    Subjective   Rickey Shaw reports that he is feeling better, even though he is still sore from the needling treatment last time.     Objective   See Exercise, Manual, and Modality Logs for complete treatment.       Assessment/Plan     Pt seems to have responded well to dry needling treatment. He has been instructed to gradually return to his normal level of activity and contact his MD if neded.     Awaiting MD orders           Manual Therapy:    35     mins  53704;  Therapeutic Exercise:    13     mins  07986;     Neuromuscular Minal:        mins  59616;    Therapeutic Activity:          mins  88754;     Gait Training:           mins  49503;     Ultrasound:          mins  85408;    Electrical Stimulation:    15     mins  43846 ( );  Dry Needling          mins self-pay    Timed Treatment:   48   mins   Total Treatment:     65   mins    Alejandro Woodward, PT  Physical Therapist

## 2021-07-15 ENCOUNTER — HOSPITAL ENCOUNTER (OUTPATIENT)
Facility: HOSPITAL | Age: 26
Discharge: HOME | End: 2021-07-15
Payer: COMMERCIAL

## 2021-07-15 PROCEDURE — 99001 SPECIMEN HANDLING PT-LAB: CPT

## 2023-04-24 ENCOUNTER — HOSPITAL ENCOUNTER (OUTPATIENT)
Facility: HOSPITAL | Age: 28
Discharge: HOME | End: 2023-04-24

## 2023-04-24 PROCEDURE — 99001 SPECIMEN HANDLING PT-LAB: CPT
